# Patient Record
Sex: FEMALE | HISPANIC OR LATINO | URBAN - NONMETROPOLITAN AREA
[De-identification: names, ages, dates, MRNs, and addresses within clinical notes are randomized per-mention and may not be internally consistent; named-entity substitution may affect disease eponyms.]

---

## 2023-04-07 NOTE — PROGRESS NOTES
Subjective    Ms. Palumbo is 68 y.o. female    Chief Complaint: Mixed urinary incontinence    History of Present Illness    68-year-old Moroccan speaking female new patient presents with daughter with complaint of worsening urinary incontinence mainly being leakage with coughing, laughing, sneezing, walking or position change.  Patient does experience urge incontinence as well at times however reports stress incontinence greater than urge.  Currently taking oxybutynin 5 mg 3 times daily over the last 3 weeks however has had to stop taking from time to time due to dry eyes.  Patient history of vaginal birth.  Requiring 3-4 pads per day usage.    The following portions of the patient's history were reviewed and updated as appropriate: allergies, current medications, past family history, past medical history, past social history, past surgical history and problem list.    Review of Systems   Constitutional: Negative for chills, fatigue and fever.   Gastrointestinal: Negative for nausea and vomiting.   Genitourinary: Positive for frequency and urgency. Negative for decreased urine volume, difficulty urinating, dysuria, flank pain, hematuria, pelvic pain and vaginal pain.         Current Outpatient Medications:   •  glimepiride (AMARYL) 2 MG tablet, Take 1 tablet by mouth Every Morning Before Breakfast., Disp: , Rfl:   •  metFORMIN (GLUCOPHAGE) 500 MG tablet, Take 1 tablet by mouth 2 (Two) Times a Day With Meals., Disp: , Rfl:   •  oxybutynin (DITROPAN) 5 MG tablet, Take 1 tablet by mouth 3 (Three) Times a Day., Disp: , Rfl:     History reviewed. No pertinent past medical history.    Past Surgical History:   Procedure Laterality Date   • KIDNEY STONE SURGERY     • TUBAL ABDOMINAL LIGATION         Social History     Socioeconomic History   • Marital status: Single   Tobacco Use   • Smoking status: Never     Passive exposure: Never   • Smokeless tobacco: Never   Vaping Use   • Vaping Use: Never used   Substance and Sexual  "Activity   • Alcohol use: Never   • Drug use: Never   • Sexual activity: Defer       History reviewed. No pertinent family history.    Objective    Temp 95.2 °F (35.1 °C)   Ht 154.9 cm (61\")   Wt 75.4 kg (166 lb 3.2 oz)   BMI 31.40 kg/m²     Physical Exam  Nursing note reviewed.   Constitutional:       General: She is not in acute distress.     Appearance: Normal appearance. She is not ill-appearing.   HENT:      Nose: No congestion.   Abdominal:      Tenderness: There is no right CVA tenderness or left CVA tenderness.      Hernia: No hernia is present.   Skin:     General: Skin is warm and dry.   Neurological:      Mental Status: She is alert and oriented to person, place, and time.   Psychiatric:         Mood and Affect: Mood normal.         Behavior: Behavior normal.             Results for orders placed or performed in visit on 04/19/23   POC Urinalysis Dipstick, Multipro    Specimen: Urine   Result Value Ref Range    Color Yellow Yellow, Straw, Dark Yellow, Tanisha    Clarity, UA Clear Clear    Glucose, UA Negative Negative mg/dL    Bilirubin Negative Negative    Ketones, UA Negative Negative    Specific Gravity  1.025 1.005 - 1.030    Blood, UA Negative Negative    pH, Urine 5.0 5.0 - 8.0    Protein, POC Negative Negative mg/dL    Urobilinogen, UA Normal Normal, 0.2 E.U./dL    Nitrite, UA Negative Negative    Leukocytes Negative Negative      Estimation of residual urine via abdominal ultrasound  Residual Urine:148  ml  Indication: urine urgency  Position: Supine  Examination: Incremental scanning of the suprapubic area using 3 MHz transducer using copious amounts of acoustic gel.   Findings: An anechoic area was demonstrated which represented the bladder, with measurement of residual urine as noted. I inspected this myself. In that the residual urine was stable or insignificant, no treatment will be necessary at this time.         Assessment and Plan    Diagnoses and all orders for this visit:    1. Mixed " incontinence (Primary)  -     POC Urinalysis Dipstick, Multipro      68-year-old Ukrainian speaking female new patient presents with daughter with complaint of worsening urinary incontinence    Stress greater than urge    Currently taking oxybutynin 5 mg 3 times daily-when able to tolerate due to extreme dry eyes-patient to continue oxybutynin at this time as we may consider adjusting in the future however patient would like to address the stress incontinence first    After lengthy discussion patient would like a referral with Dr. Damon for possible Bulkamid injection procedure as I have patient provided patient with a brochure    Patient is a self-pay patient I have informed patient that this evaluation would include a cystoscopy evaluation in addition to later being scheduled for the Bulkamid procedure that is completed and the [facility] operating room.  Patient and daughter are aware and voiced understanding.

## 2023-04-19 ENCOUNTER — OFFICE VISIT (OUTPATIENT)
Dept: UROLOGY | Facility: CLINIC | Age: 68
End: 2023-04-19
Payer: MEDICAID

## 2023-04-19 ENCOUNTER — TELEPHONE (OUTPATIENT)
Dept: UROLOGY | Facility: CLINIC | Age: 68
End: 2023-04-19
Payer: MEDICAID

## 2023-04-19 VITALS — TEMPERATURE: 95.2 F | BODY MASS INDEX: 31.38 KG/M2 | HEIGHT: 61 IN | WEIGHT: 166.2 LBS

## 2023-04-19 DIAGNOSIS — N39.46 MIXED INCONTINENCE: Primary | ICD-10-CM

## 2023-04-19 LAB
BILIRUB BLD-MCNC: NEGATIVE MG/DL
CLARITY, POC: CLEAR
COLOR UR: YELLOW
GLUCOSE UR STRIP-MCNC: NEGATIVE MG/DL
KETONES UR QL: NEGATIVE
LEUKOCYTE EST, POC: NEGATIVE
NITRITE UR-MCNC: NEGATIVE MG/ML
PH UR: 5 [PH] (ref 5–8)
PROT UR STRIP-MCNC: NEGATIVE MG/DL
RBC # UR STRIP: NEGATIVE /UL
SP GR UR: 1.02 (ref 1–1.03)
UROBILINOGEN UR QL: NORMAL

## 2023-04-19 RX ORDER — GLIMEPIRIDE 2 MG/1
2 TABLET ORAL
COMMUNITY

## 2023-04-19 RX ORDER — OXYBUTYNIN CHLORIDE 5 MG/1
5 TABLET ORAL 3 TIMES DAILY
COMMUNITY

## 2023-04-19 NOTE — TELEPHONE ENCOUNTER
Caller: DEVIN LIN     Relationship: DAUGHTER    Best call back number: [phone]    PT DTR NEEDS TO KNOW CODE TO GIVE TO [employer]IMATES TEAM SO THEY CAN FIND OUT COST OF BULKAMID.

## 2023-04-21 NOTE — TELEPHONE ENCOUNTER
Called Gali.  Unable to reach.  Left voicemail to call the office to get the CPT code she requested.

## 2023-04-25 ENCOUNTER — PROCEDURE VISIT (OUTPATIENT)
Dept: UROLOGY | Facility: CLINIC | Age: 68
End: 2023-04-25

## 2023-04-25 DIAGNOSIS — N39.46 MIXED INCONTINENCE: Primary | ICD-10-CM

## 2023-04-25 LAB
BILIRUB BLD-MCNC: NEGATIVE MG/DL
CLARITY, POC: CLEAR
COLOR UR: YELLOW
GLUCOSE UR STRIP-MCNC: NEGATIVE MG/DL
KETONES UR QL: NEGATIVE
LEUKOCYTE EST, POC: ABNORMAL
NITRITE UR-MCNC: NEGATIVE MG/ML
PH UR: 5 [PH] (ref 5–8)
PROT UR STRIP-MCNC: NEGATIVE MG/DL
RBC # UR STRIP: ABNORMAL /UL
SP GR UR: 1.02 (ref 1–1.03)
UROBILINOGEN UR QL: NORMAL

## 2023-04-25 PROCEDURE — 52000 CYSTOURETHROSCOPY: CPT | Performed by: UROLOGY

## 2023-04-25 PROCEDURE — 81001 URINALYSIS AUTO W/SCOPE: CPT | Performed by: UROLOGY

## 2023-04-25 NOTE — PROGRESS NOTES
Subjective     {CC  Problem List  Visit Diagnosis   Encounters  Notes  Medications  Labs  Result Review Imaging  Media :23}     Helen Palumbo presents to [facility] [place]   Patient with up to 4 pad per day incontinence.  Most of her incontinence is related to laughing, coughing, sneezing and activity.  She is interested in Bulkamid procedure.  Patient is also taking oxybutynin 5 mg 3 times daily but was intolerant of this book.  She had significant urinary urgency.  Voids with good urinary stream.      Current Outpatient Medications:   •  glimepiride (AMARYL) 2 MG tablet, Take 1 tablet by mouth Every Morning Before Breakfast., Disp: , Rfl:   •  metFORMIN (GLUCOPHAGE) 500 MG tablet, Take 1 tablet by mouth 2 (Two) Times a Day With Meals., Disp: , Rfl:   •  oxybutynin (DITROPAN) 5 MG tablet, Take 1 tablet by mouth 3 (Three) Times a Day., Disp: , Rfl:   No past medical history on file.  Past Surgical History:   Procedure Laterality Date   • KIDNEY STONE SURGERY     • TUBAL ABDOMINAL LIGATION             Review of Systems      Objective   PHYSICAL EXAM  Vital Signs:   There were no vitals taken for this visit.    Physical Exam  Pelvic exam:     DATA  Result Review :{ Labs  Result Review  Imaging  Med Tab  Media :23}   {The following data was reviewed by (Optional):[id number]}  {Ambulatory Labs (Optional):62165}  {Data reviewed (Optional):84684:::1}       Results for orders placed or performed in visit on 04/19/23   POC Urinalysis Dipstick, Multipro    Specimen: Urine   Result Value Ref Range    Color Yellow Yellow, Straw, Dark Yellow, Tanisha    Clarity, UA Clear Clear    Glucose, UA Negative Negative mg/dL    Bilirubin Negative Negative    Ketones, UA Negative Negative    Specific Gravity  1.025 1.005 - 1.030    Blood, UA Negative Negative    pH, Urine 5.0 5.0 - 8.0    Protein, POC Negative Negative mg/dL    Urobilinogen, UA Normal Normal, 0.2 E.U./dL    Nitrite, UA Negative  Negative    Leukocytes Negative Negative                        ASSESSMENT AND PLAN     {CC Problem List  Visit Diagnosis  ROS  Review (Popup)  Health Maintenance  Quality  BestPractice  Medications  SmartSets  SnapShot Encounters  Media :23}     Problem List Items Addressed This Visit    None  Visit Diagnoses     Mixed incontinence    -  Primary    Relevant Orders    POC Urinalysis Dipstick, Multipro          ***    {Time Spent (Optional):50255}  FOLLOW UP   {Instructions Charge Capture  Follow-up Communications :23}  No follow-ups on file.        (Please note that portions of this note were completed with a voice recognition program.)  Jakob Damon MD  04/25/23  06:27 CDT

## 2023-04-25 NOTE — PROGRESS NOTES
CC: I am here for the doctor to look at my bladder    Cystoscopy procedure note  Pre- operative diagnosis:  incontinence    Post operative diagnosis:  Same    Procedure:  The patient was prepped and draped in a normal sterile fashion.  The urethra was anesthetized with 2% lidocaine jelly.  A flexible cystoscope was introduced per urethra.   The urethra is normal in appearance without obstruction or mass.  The bladder is without evidence of mucosal lesion.   There is no abnormality of the urothelium.  There is minimal trabeculation of the detrusor muscle.  The ureteral orifices are orthotopic in position and they efflux clear urine.    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 Slight to moderate cystocele with bladder filled after cystoscopy. Mild to moderate atrophic vaginitis       Patient tolerated the procedure well    Complications: none    Blood loss: minimal           ASSESSMENT AND PLAN          Problem List Items Addressed This Visit    None  Visit Diagnoses     Mixed incontinence    -  Primary    Relevant Orders    POC Urinalysis Dipstick, Multipro (Completed)    Ambulatory Referral to Gynecology          Based on exam this patient's vesical neck hypermobility is a approximately 55 degrees.  She has a small cystocele associated with this as well.  She leaked a significant stream upon coughing.  I suspect she has a combination of vesical neck hypermobility with intrinsic sphincteric dysfunction.  I think her best option given these findings on exam is that she undergo some type of mid urethral sling.  I explained to the patient through her  that I do not do these procedures.  I recommended that she see Dr. Huitron or Dr. Pablo.    No follow-ups on file.      Jakob Damon MD  4/25/2023  09:29 CDT